# Patient Record
Sex: FEMALE | Race: BLACK OR AFRICAN AMERICAN | NOT HISPANIC OR LATINO | Employment: UNEMPLOYED | URBAN - METROPOLITAN AREA
[De-identification: names, ages, dates, MRNs, and addresses within clinical notes are randomized per-mention and may not be internally consistent; named-entity substitution may affect disease eponyms.]

---

## 2019-06-26 PROBLEM — N62 MACROMASTIA: Status: ACTIVE | Noted: 2019-06-26

## 2020-02-24 PROBLEM — F43.10 POSTTRAUMATIC STRESS DISORDER: Status: ACTIVE | Noted: 2020-02-24

## 2020-02-24 PROBLEM — G89.29 CHRONIC BACK PAIN: Status: ACTIVE | Noted: 2020-02-24

## 2020-02-24 PROBLEM — M54.9 CHRONIC BACK PAIN: Status: ACTIVE | Noted: 2020-02-24

## 2020-04-07 ENCOUNTER — NURSE TRIAGE (OUTPATIENT)
Dept: ADMINISTRATIVE | Facility: CLINIC | Age: 45
End: 2020-04-07

## 2020-04-07 NOTE — TELEPHONE ENCOUNTER
Pt called about information regarding COVID. Her daughter went to the hospital and was tested and released. Her results are pending. She wanted to know what her daughter should do. She should tell daugher to self-isolate until results come back and wear a mask and wash hands regularly. Caller has been in contact with her daughter and told to monitor for symptoms.

## 2020-05-07 PROBLEM — F31.81 BIPOLAR II DISORDER: Status: ACTIVE | Noted: 2020-02-24

## 2020-05-07 PROBLEM — G56.03 BILATERAL CARPAL TUNNEL SYNDROME: Status: ACTIVE | Noted: 2020-05-07

## 2020-05-21 PROBLEM — F31.81 BIPOLAR II DISORDER: Chronic | Status: ACTIVE | Noted: 2020-02-24

## 2020-05-21 PROBLEM — F43.10 PTSD (POST-TRAUMATIC STRESS DISORDER): Status: ACTIVE | Noted: 2020-05-21

## 2020-05-21 PROBLEM — F43.10 PTSD (POST-TRAUMATIC STRESS DISORDER): Chronic | Status: ACTIVE | Noted: 2020-05-21

## 2020-08-18 PROBLEM — M06.9 RHEUMATOID ARTHRITIS: Status: ACTIVE | Noted: 2020-08-18

## 2020-09-01 PROBLEM — F31.5 BIPOLAR I DISORDER, CURRENT OR MOST RECENT EPISODE DEPRESSED, WITH PSYCHOTIC FEATURES WITH ANXIOUS DISTRESS: Status: ACTIVE | Noted: 2020-09-01

## 2020-09-16 PROBLEM — F31.81 BIPOLAR II DISORDER: Chronic | Status: RESOLVED | Noted: 2020-02-24 | Resolved: 2020-09-16

## 2021-01-05 PROBLEM — F41.1 GAD (GENERALIZED ANXIETY DISORDER): Status: ACTIVE | Noted: 2021-01-05

## 2021-05-12 ENCOUNTER — PATIENT MESSAGE (OUTPATIENT)
Dept: RESEARCH | Facility: HOSPITAL | Age: 46
End: 2021-05-12

## 2021-08-23 PROBLEM — G56.00 CARPAL TUNNEL SYNDROME: Status: ACTIVE | Noted: 2017-11-17

## 2022-08-29 DIAGNOSIS — Z12.31 OTHER SCREENING MAMMOGRAM: ICD-10-CM

## 2022-08-31 ENCOUNTER — PATIENT MESSAGE (OUTPATIENT)
Dept: ADMINISTRATIVE | Facility: HOSPITAL | Age: 47
End: 2022-08-31

## 2023-11-27 ENCOUNTER — PATIENT OUTREACH (OUTPATIENT)
Dept: ADMINISTRATIVE | Facility: HOSPITAL | Age: 48
End: 2023-11-27

## 2024-06-19 ENCOUNTER — PATIENT MESSAGE (OUTPATIENT)
Dept: ADMINISTRATIVE | Facility: HOSPITAL | Age: 49
End: 2024-06-19

## 2024-08-22 ENCOUNTER — PATIENT OUTREACH (OUTPATIENT)
Dept: ADMINISTRATIVE | Facility: HOSPITAL | Age: 49
End: 2024-08-22

## 2024-09-23 PROBLEM — K57.92 DIVERTICULITIS: Status: ACTIVE | Noted: 2024-09-23

## 2024-09-23 PROBLEM — D21.9 FIBROIDS: Status: ACTIVE | Noted: 2024-09-23

## 2024-09-24 PROBLEM — A49.8 CLOSTRIDIUM DIFFICILE INFECTION: Status: ACTIVE | Noted: 2024-09-24

## 2024-09-25 PROBLEM — E87.6 HYPOKALEMIA: Status: ACTIVE | Noted: 2024-09-25

## 2024-10-17 ENCOUNTER — PATIENT OUTREACH (OUTPATIENT)
Dept: ADMINISTRATIVE | Facility: HOSPITAL | Age: 49
End: 2024-10-17

## 2024-10-22 ENCOUNTER — PATIENT MESSAGE (OUTPATIENT)
Dept: ADMINISTRATIVE | Facility: HOSPITAL | Age: 49
End: 2024-10-22

## 2024-11-22 ENCOUNTER — PATIENT MESSAGE (OUTPATIENT)
Dept: RESEARCH | Facility: HOSPITAL | Age: 49
End: 2024-11-22
Payer: COMMERCIAL

## 2025-04-17 ENCOUNTER — PATIENT MESSAGE (OUTPATIENT)
Dept: ADMINISTRATIVE | Facility: HOSPITAL | Age: 50
End: 2025-04-17
Payer: COMMERCIAL

## 2025-04-17 ENCOUNTER — PATIENT OUTREACH (OUTPATIENT)
Dept: ADMINISTRATIVE | Facility: HOSPITAL | Age: 50
End: 2025-04-17
Payer: COMMERCIAL

## 2025-05-20 ENCOUNTER — PATIENT OUTREACH (OUTPATIENT)
Dept: ADMINISTRATIVE | Facility: HOSPITAL | Age: 50
End: 2025-05-20
Payer: COMMERCIAL

## 2025-05-23 ENCOUNTER — PATIENT OUTREACH (OUTPATIENT)
Dept: ADMINISTRATIVE | Facility: HOSPITAL | Age: 50
End: 2025-05-23
Payer: COMMERCIAL

## 2025-05-23 DIAGNOSIS — Z12.12 ENCOUNTER FOR COLORECTAL CANCER SCREENING: Primary | ICD-10-CM

## 2025-05-23 DIAGNOSIS — Z12.11 ENCOUNTER FOR COLORECTAL CANCER SCREENING: Primary | ICD-10-CM

## 2025-05-23 NOTE — PROGRESS NOTES
5-23-25 follow up on campaign msg per patient she did not receive kit, cologuard kit order was placed